# Patient Record
Sex: MALE | Race: WHITE | Employment: OTHER | URBAN - NONMETROPOLITAN AREA
[De-identification: names, ages, dates, MRNs, and addresses within clinical notes are randomized per-mention and may not be internally consistent; named-entity substitution may affect disease eponyms.]

---

## 2019-05-20 ENCOUNTER — HOSPITAL ENCOUNTER (OUTPATIENT)
Dept: CARDIAC REHAB | Age: 74
Setting detail: THERAPIES SERIES
Discharge: HOME | End: 2019-05-20
Payer: OTHER GOVERNMENT

## 2019-05-20 VITALS
SYSTOLIC BLOOD PRESSURE: 136 MMHG | RESPIRATION RATE: 24 BRPM | HEART RATE: 95 BPM | DIASTOLIC BLOOD PRESSURE: 74 MMHG | HEIGHT: 68 IN | OXYGEN SATURATION: 95 % | WEIGHT: 122 LBS | BODY MASS INDEX: 18.49 KG/M2

## 2019-05-20 PROCEDURE — G0424 PULMONARY REHAB W EXER: HCPCS

## 2019-05-20 RX ORDER — LATANOPROST 50 UG/ML
1 SOLUTION/ DROPS OPHTHALMIC NIGHTLY
COMMUNITY

## 2019-05-20 RX ORDER — TAMSULOSIN HYDROCHLORIDE 0.4 MG/1
0.4 CAPSULE ORAL DAILY
COMMUNITY

## 2019-05-20 RX ORDER — DORZOLAMIDE HCL 20 MG/ML
2 SOLUTION/ DROPS OPHTHALMIC 3 TIMES DAILY
COMMUNITY

## 2019-05-20 RX ORDER — ALBUTEROL SULFATE 0.63 MG/3ML
1 SOLUTION RESPIRATORY (INHALATION) EVERY 6 HOURS PRN
COMMUNITY

## 2019-05-20 RX ORDER — BUDESONIDE AND FORMOTEROL FUMARATE DIHYDRATE 160; 4.5 UG/1; UG/1
2 AEROSOL RESPIRATORY (INHALATION) 2 TIMES DAILY
COMMUNITY

## 2019-05-20 RX ORDER — ATENOLOL 25 MG/1
12.5 TABLET ORAL DAILY
COMMUNITY

## 2019-05-20 RX ORDER — FLUTICASONE PROPIONATE 50 MCG
1 SPRAY, SUSPENSION (ML) NASAL DAILY
COMMUNITY

## 2019-05-20 RX ORDER — BRIMONIDINE TARTRATE 2 MG/ML
1 SOLUTION/ DROPS OPHTHALMIC 3 TIMES DAILY
COMMUNITY

## 2019-05-20 RX ORDER — MENTHOL AND ZINC OXIDE .44; 20.625 G/100G; G/100G
OINTMENT TOPICAL DAILY
COMMUNITY

## 2019-05-20 NOTE — PROGRESS NOTES
coping skills.  -Positive support system. Fall Risk assess: [x] Yes  [] No  Assistive Device:   [] Cane  [] Walker [x] Wheel Chair  [] Gait belt Pt presents with a wheelchair, but does walk. Patient/Program goal:   · Increased stamina/strength to 30-50min total exercise by increasing 1-2 level/wk and/or 1-2min/wk to maintain SaO2 > 90%, achieve THR and RPE 11-13. · Introduce weights/ therabands 1-2# for 5-10 reps. · Less SOB on exertion by utilizing pursed-lip breathing. · Proper use of inhalers, O2 therapy and meds. · Increased knowledge of COPD and optimal management. · [place] with chronic health changes. · Manage stress by utilizing relaxation techniques. · Increased knowledge of health eating. · Improve/Maintain quality of life. · Manage/Control blood pressure.     Physician Changes/Comments:      Pulmonary Rehab Staff

## 2019-05-20 NOTE — PROGRESS NOTES
Pulmonary Rehab Initial History and Assessment    Nathalie Jovel [id number]  5/20/2019    Living Will:  [x] Yes [] No   On File:  [] Yes [x] No   Durable Power of :  [x] Yes [] No     Medical History  No past medical history on file. Symptoms:  [x] Cough (frequency):  frequent  [] Wheezing (Onset/Cause):  [] Fluid Retention (Where/When):  [x] Sleeping Problems (# Hours):6- frequent awakenings  [x] Sputum (Volume/Color/Viscosity/Frequency):frequent- clear  [x] Dyspnea (onset/cause):all the time  [x] Extra Pillows (Number): 2    Dyspnea Index (choose one):  [] Class 1 - No dyspnea except on strenuous exercise  [] Class 2 - SOB when walking up short hill  [] Class 3 - Dyspnea limits walking pace (slower than others) and stops to breathe.  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  [] Asbestos    Respiratory Infections/Hospitalizations:  # infections/year: 0 Antibiotic use:  # Hospitalizations/year 0 When/Problem:  Vaccines:   [x] Flu (yr):  [x] Pneumonia (yr):    Activities of Daily Living:  [x] Independent      Needs assist with:    [x] Hygiene [] Diet  [x] ADL  [] Other (explain):  [] Stairs    Fluid Intake (glasses/day):   Caffeine: 0/day   Water: 0/day  Juice- fruit   Beer- [facility]    Nutrition:  Appetite: [] good [] too good [x] poor  Eating out: 1/wk  Special Diet: no cabbage, no acidic foods,no raw veggies     Stress Management:  Stressors:  Relaxation Techniques: PLB reviewed,   Leisure activities/Hobbies: watching TV    Depression Screening:  [] Have you been feeling sad. ..down in the dumps? [] Have you lost interest in your job, sports, hobbies, friends? [x] Do you often feel tired? [] Do you have trouble sleeping or do you sleep too much? [] Have you been gaining or losing weight? [] Do you often feel down on yourself, that everything is your fault? [] Do you have troubled making decisions or concentrating on your work? [] Do you often feel agitated or like you can barely move? [] Do you ever feel that life isn't worth living?    *If greater than 5 symptoms listed,  notified. Pre- Program Assessment  1. The primary purpose of the lungs is to:   [x] A. Bring in fresh air   [] B. Breathe out stale air   [] C. Pump blood to our body   [] D. A and B  2. The muscle that does most of the work of breathing is the diaphragm? [x] True   [] False    [] Undecided  3. Smoking:   [] A. Damages the lining of the lungs.   [] B. Paralyzes cilia   [] C. May increase mucous production. [x] D. All of the above  4. My illness makes me feel helpless, confused, and out of control of my life at times. [] True   [x] False   [] Undecided  5. What is your current Diet? :regular  6. Parts of a treatment plan may include all of the following except:   [] A. Bronchodilator medication. [x] [place] at a fast pace.   [] C. Drinking lots of fluid.   [] D. Stop smoking. 7. Pursed lip breathing helps you to slow your breathing rate. [x] True   [] False   [] Undecided  8. Pursed lip breathing helps to prevent collapse of the airways. [x] True   [] False   [] Undecided  9. With diaphragmatic breathing your stomach moved out as you inhale. [x] True   [] False   [] Undecided  10. The environment around you does not affect your breathing. [] True   [x] False   [] Undecided  11. Signs of infection include:   [] Normal temperature   [] Increased fatigue and lack of energy   [] Change in amount of sputum   [] Increase appetite   [x] B and C  12.  Feeling full after a meal is normal and does not affect breathing. [] True   [x] False   [] Undecided  13. Smoking can cause lung disease. [x] True   [] False   [] Undecided  14. Work efficiency involves making tasks easier. [x] True   [] False   [] Undecided  15. If a little oxygen is good, then more is even better. [] True   [x] False   [] Undecided  16. I know the purpose for each of my medications. [x] True   [] False   [] Undecided  17. Relaxation makes you breath better. [x] True   [] False   [] Undecided  18. Drinking water has no effect on the mucous produced in your lungs. [] True   [x] False   [] Undecided  19. Exercise does not help people with lung disease. [] True   [x] False   [] Undecided  20. Cleaning my home equipment at least every 2 days with white vinegar and water (1:2) is vital to reduce the change of using contaminated equipment. [] True   [] False   [] Undecided  21. If I take more than 1 inhaler, I know in which order to take them. [x] True   [] False   [] Undecided  22. Family communication promotes closeness and decreases fear for everyone. [x] True   [] False   [] Undecided  23. Nutrition labels on food packages contain.   [] A. Calories   [] B. Fat   [] C. Protein   [] D. Carbohydrates   [x] E. All of the above  Comments:      Physical Findings   BP: 136/74   Pulse: 95   Resp: 24   SpO2: 95 %    Goals  · Increased stamina/strength to 30-50min total exercise by increasing 1-2 level/wk and/or 1-2min/wk to maintain SaO2 > 90%, achieve THR and RPE 11-13. · Introduce weights/ therabands 1-2# for 5-10 reps. · Less SOB on exertion by utilizing pursed-lip breathing. · Proper use of inhalers, O2 therapy and meds. · Increased knowledge of COPD and optimal management. · [place] with chronic health changes. · Manage stress by utilizing relaxation techniques. · Increased knowledge of health eating. · Improve/Maintain quality of life. · Manage/Control blood pressure.

## 2019-05-21 NOTE — PROGRESS NOTES
MEDICAL NUTRITION THERAPY - CARDIOPULMONARY FOOD DIARY EVALUATION  Client data    Ht: 68\" Wt: 122# BMI: 18.6  (normal/almost underweight)   Weight changes: unknown CBW: 55.5 kg   BMR: 1263 calories Est. total calorie needs: 1700 cals. Food diary:  Reveals only 2 meals were consumed with no snacks, skipping lunch is normal per diary. No whole fruits or vegetables, only Strawberry Banana V8 drink. No water recorded, only whole milk for cereal and 1 beer in the evening were recorded for beverages. Cheerios for breakfast and wheat toast for dinner, but unsure if it is 100% whole wheat. Snacks like Oreo cookies and Rice [employer] were eaten with breakfast. Protein choices were 2 sausage links and 1 egg. A few missing portion sizes and only breakfast time (8am) recorded. Recommendations include: Use whole grains (choose instead of refined wheat products)      Utilize whole fruit and vegetable use (at least 5 a day combined)      Utilize 6 smaller meals and snacks if it eases your breathing      Drink plenty of fluids to keep any phlegm thinner      Avoid sugar sweetened or carbonated beverages      Aim for daily activity (>30 minutes)      If you struggle with keeping weight, use more healthy fats such as canola,     olive or peanut oils. Adding heavy cream, cool whip, nuts, or peanut butter to     meals and snack are other options. Review highlighted food label handout for areas to focus on. Provided literature on Healthy Eating for Pulmonary Disease with commentary from food diary reviewed (including recommendations to improve nutrition intakes). (attached below).     Electronically signed by Clemente Davila Dietetic Intern 5/21/2019, 1:26 PM  Electronically signed by Barbara Mesa RD, LD on 5/21/2019 at 1:14 PM    Cardiopulmonary Rehab   Medical Nutrition Therapy Food Diary Evaluation    Patient Name: Maria Isabel Mingo   Dietetic Student: Clemente Davila   Registered Dietitian:  Barbara Mesa HERMINIO GOMEZ  Date:  5/21/2019    Dear Daily Bergeron,    Thank you for sharing your information about your eating patterns, it serves not only to help me see what you are eating, but you as well. Eating 3 meals a day is a great way to start, work on trying to eat lunch in order to be able to consume adequate nutrition. If you have shortness of breath, 6 small meals may be of benefit. Whole grains, fruits and vegetables, along with lean meats, low fat dairy and healthy fats are the cornerstones of your health. With that in mind, look below for some suggestions. · Overall, create well-balanced meals and utilize all of the food groups. Limit fat to 66 grams total fat, 13 saturated fat and zero (0) grams Trans fat (these numbers are based on the [place]olidated [place] Cholesterol Education Program and American Heart Association recommendations). Sodium should be kept under 2000 mg daily. · Aim for at least 2 servings of fresh or frozen veggies daily (not including peas, corn or potatoes). We call these real vegetables. Most people do not consume enough. They are good for vitamins, minerals and fiber. Use at lunch and dinner, or snacks. · Aim for at least 3 servings of fresh, frozen or canned fruit daily. Eat your fruits and avoid drinking them. Fresh fruit with the skin is the most nutritious way to go. Be consistent by having a fruit choice at each meal.  Keep in mind that every 4 ounces of juice is equivalent to a piece of fruit, so it is very easy to over-consume when we drink fruits. · When choosing meat, avoid high fat varieties, such as red meats. Three ounces, or about the size of a deck of cards is a good portion at lunch and dinner. Eggs are good for protein too, but limit yolks to 3 per week (per the American Heart Association). · When cooking, try to bake, broil or grill. If you have to shah something, use healthy oils or sprays that are canola, olive or peanut oil based.   · Be careful of hidden sodium in whole fruit and vegetable use (at least 5 a day combined)      Utilize 6 smaller meals and snacks if it eases your breathing      Drink plenty of fluids to keep any phlegm thinner      Avoid sugar sweetened or carbonated beverages      Aim for daily activity (>30 minutes)      If you struggle with keeping weight, use more healthy fats such as canola,     olive or peanut oils. Adding heavy cream, cool whip, nuts, peanut butter to     meals and snack are other options. Review highlighted food label handout for areas to focus on. Please contact a dietitian at [phone] or [phone] with any questions.

## 2019-05-22 ENCOUNTER — HOSPITAL ENCOUNTER (OUTPATIENT)
Dept: CARDIAC REHAB | Age: 74
Setting detail: THERAPIES SERIES
Discharge: HOME | End: 2019-05-22
Payer: OTHER GOVERNMENT

## 2019-05-22 PROCEDURE — 93798 PHYS/QHP OP CAR RHAB W/ECG: CPT

## 2019-05-22 RX ORDER — ATENOLOL 100 MG/1
100 TABLET ORAL DAILY
COMMUNITY

## 2019-05-23 ENCOUNTER — HOSPITAL ENCOUNTER (OUTPATIENT)
Dept: CARDIAC REHAB | Age: 74
Setting detail: THERAPIES SERIES
Discharge: HOME | End: 2019-05-23
Payer: OTHER GOVERNMENT

## 2019-05-23 PROCEDURE — G0424 PULMONARY REHAB W EXER: HCPCS

## 2019-05-23 PROCEDURE — 93798 PHYS/QHP OP CAR RHAB W/ECG: CPT

## 2019-05-29 ENCOUNTER — HOSPITAL ENCOUNTER (OUTPATIENT)
Dept: CARDIAC REHAB | Age: 74
Setting detail: THERAPIES SERIES
Discharge: HOME | End: 2019-05-29
Payer: OTHER GOVERNMENT

## 2019-05-29 PROCEDURE — G0424 PULMONARY REHAB W EXER: HCPCS

## 2019-05-29 PROCEDURE — 93798 PHYS/QHP OP CAR RHAB W/ECG: CPT

## 2019-06-03 ENCOUNTER — HOSPITAL ENCOUNTER (OUTPATIENT)
Dept: CARDIAC REHAB | Age: 74
Setting detail: THERAPIES SERIES
Discharge: HOME | End: 2019-06-03
Payer: OTHER GOVERNMENT

## 2019-06-03 PROCEDURE — G0424 PULMONARY REHAB W EXER: HCPCS

## 2019-06-05 ENCOUNTER — HOSPITAL ENCOUNTER (OUTPATIENT)
Dept: CARDIAC REHAB | Age: 74
Setting detail: THERAPIES SERIES
Discharge: HOME | End: 2019-06-05
Payer: OTHER GOVERNMENT

## 2019-06-05 PROCEDURE — G0424 PULMONARY REHAB W EXER: HCPCS

## 2019-06-06 ENCOUNTER — HOSPITAL ENCOUNTER (OUTPATIENT)
Dept: CARDIAC REHAB | Age: 74
Setting detail: THERAPIES SERIES
Discharge: HOME | End: 2019-06-06
Payer: OTHER GOVERNMENT

## 2019-06-06 PROCEDURE — G0424 PULMONARY REHAB W EXER: HCPCS

## 2019-06-10 ENCOUNTER — HOSPITAL ENCOUNTER (OUTPATIENT)
Dept: CARDIAC REHAB | Age: 74
Setting detail: THERAPIES SERIES
Discharge: HOME | End: 2019-06-10
Payer: OTHER GOVERNMENT

## 2019-06-10 PROCEDURE — G0424 PULMONARY REHAB W EXER: HCPCS

## 2019-06-12 ENCOUNTER — HOSPITAL ENCOUNTER (OUTPATIENT)
Dept: CARDIAC REHAB | Age: 74
Setting detail: THERAPIES SERIES
Discharge: HOME | End: 2019-06-12
Payer: OTHER GOVERNMENT

## 2019-06-12 PROCEDURE — G0424 PULMONARY REHAB W EXER: HCPCS

## 2019-06-13 ENCOUNTER — HOSPITAL ENCOUNTER (OUTPATIENT)
Dept: CARDIAC REHAB | Age: 74
Setting detail: THERAPIES SERIES
Discharge: HOME | End: 2019-06-13
Payer: OTHER GOVERNMENT

## 2019-06-13 PROCEDURE — G0424 PULMONARY REHAB W EXER: HCPCS

## 2019-06-17 ENCOUNTER — HOSPITAL ENCOUNTER (OUTPATIENT)
Dept: CARDIAC REHAB | Age: 74
Setting detail: THERAPIES SERIES
Discharge: HOME | End: 2019-06-17
Payer: OTHER GOVERNMENT

## 2019-06-17 PROCEDURE — G0424 PULMONARY REHAB W EXER: HCPCS

## 2019-06-19 ENCOUNTER — HOSPITAL ENCOUNTER (OUTPATIENT)
Dept: CARDIAC REHAB | Age: 74
Setting detail: THERAPIES SERIES
Discharge: HOME | End: 2019-06-19
Payer: OTHER GOVERNMENT

## 2019-06-19 PROCEDURE — 93798 PHYS/QHP OP CAR RHAB W/ECG: CPT

## 2019-06-19 NOTE — PROGRESS NOTES
Pulmonary Rehab Individualized Treatment Plan-30 day    Patient Name: Faustino Lester #: [de-identified]  Diagnosis: COPD   Date of last  Visit 6/18/19  Onset Date: 4/25/19  Referring Physician: Yoselin Vasquez)  Risk Stratification: high  Session Number: 11     EXERCISE    Stages of Change:   [] pre-contemplation   [x] Action   [] Contemplate   [] Maintainence   [] Prep   [] Relapse           Exercise Prescription:  Mode: [x] TM [x] B [x] STP [] EL [x] R  Frequency: 3 times weekly  Duration: 31-60 minutes  Intensity: 1.6-1.9 METS  Progression: 30-50min total exercise by increasing 1-2 level/wk and/or 1-2min/wk to maintain SaO2 > 90%, achieve THR and RPE 11-13. Pt has fluctuated speed from 0.7-1.1 as tolerated. SPO2: >90%  [x] O2 - Liters: 4-6 LPM  [] Resistance Training Weights (lbs):   Reps:      Hypertension:  [x] Yes  [] No  Resting BP: 122/64  Peak Exercise BP: 116/78  [] Med change? Intervention:  Home Exercise:  Type: walking  Duration: 30 minutes  Frequency: daily  O2 (L/min): 4-6 LPM   [] Resistance Training    Education:   [x] Equipment [place]  [x] PLB     [x] Ex Safety   [x] S/S to report    [x] Warm up/ Cool down  [x] O2 Therapy    [x] RPE Scale   [x] Energy conservation   [x] Dyspnea scale  [] Exercise specialist class-Home exercise         Target Goal:   - SPO2 > 90 during exercise  - Individual exercise Rx      Nutrition    Stages of Change:   [] pre-contemplation   [x] Action   [] Contemplate   [] Maintainence   [] Prep   [] Relapse     Diabetes:  [] Yes  [x] No  BS:   HbA1c:  Random BS:  BS in range: [] yes [] no  [] Med change? Weight Management:  Current Wt: 114  Wt Goal:     Dietary Goal:         Intervention:   [x] Dietitian Consult Scheduled for 6/20. Pt & wife reluctant to attend.       [x] Nurse/Patient Discussion     [] Diet Class          Education:  [] S&S hypo/hyperglycemia  [] Relate diabetes to pulmonary disease  [x] Healthy/balanced eating habits    Target Goal:  -HbA1c < 7%  -BMI < 25   Education    Stages of Change:    [] pre-contemplation   [x] Action   [] Contemplate   [] Maintainence   [] Prep   [] Relapse     Family support: [x] Yes  [] No    Tobacco use: [] Yes  [x] No  Date quit:2003  Quit date set:  # cigarettes smoked per day:   [] smokeless tobacco   Amount:    Breath sounds:    [] chest physio     [] Flutter   [] Acapella     Intervention:  [] Referred to smoking cessation counselor     [x] Individual education and counseling  [] Tobacco adjunct  [] Informed of education classes     Education:   [x] Dangers of smoking  [x] Dyspnea management  [x] Meds/MDI   [x] Pulmonary and secretion A&P; specific disease  [x] signs of infection  [x] Diaphragmatic Breathing    [x] Traveling   [x] Care of Home equipment    Target Goal:  -Complete cessation of tobacco use (if applicable)  -Self-man and prevention exacerbation  -Restore to highest level of independance      Psychosocial  Stages of Change:    [] pre-contemplation   [x] Action   [] Contemplate   [] Maintainence   [] Prep   [] Relapse    Intervention:   [] Psych Consult/   [x] Uses stress management    [] Physician Referral     [] Stress management class   [] Med change? Education:    [] Coping with Chronic illness   [x] Relaxation techniques   [x] S/S of Depression   [x] Support system    Target Goal:  -Assess presence or absence of depression using a valid screening tool. -Maximize coping skills.  -Positive support system. Patient/Program goal:   · Increased stamina/strength to 30-50min total exercise by increasing 1-2 level/wk and/or 1-2min/wk to maintain SaO2 > 90%, achieve THR and RPE 11-13. Pt has fluctuated speed from 0.7-1.1 as tolerated. Mets remain between 1.6-1.8. · Introduce weights/ therabands 1-2# for 5-10 reps. Will introduce at a later date  · Less SOB on exertion by utilizing pursed-lip breathing. Pt remains SOB during exercise.  Resolves with rest. Staff has continued to attempt to educate pt & wife re: PLB. Frequent reminders given. · Proper use of inhalers, O2 therapy and meds. Medications reviewed with Pt. All questions answered. · Increased knowledge of COPD and optimal management. Education continues  · [place] with chronic health changes. Education continues  · Manage stress by utilizing relaxation techniques. PLB & deep breathing exercises reviewed with pt. · Increased knowledge of health eating. Rate your plate given to [employer] for review. Results given to pt. Pt & wife are reluctant to meet with the [address]. · Improve/Maintain quality of life. Pt feels his stamina is increasing. · Manage/Control blood pressure.   WNL          Physician Changes/Comments:      Pulmonary Rehab Staff

## 2019-06-20 ENCOUNTER — HOSPITAL ENCOUNTER (OUTPATIENT)
Dept: CARDIAC REHAB | Age: 74
Setting detail: THERAPIES SERIES
Discharge: HOME | End: 2019-06-20
Payer: OTHER GOVERNMENT

## 2019-06-20 PROCEDURE — G0424 PULMONARY REHAB W EXER: HCPCS

## 2019-06-26 ENCOUNTER — HOSPITAL ENCOUNTER (OUTPATIENT)
Dept: CARDIAC REHAB | Age: 74
Setting detail: THERAPIES SERIES
Discharge: HOME | End: 2019-06-26
Payer: OTHER GOVERNMENT

## 2019-06-26 PROCEDURE — G0424 PULMONARY REHAB W EXER: HCPCS

## 2019-06-27 ENCOUNTER — HOSPITAL ENCOUNTER (OUTPATIENT)
Dept: CARDIAC REHAB | Age: 74
Setting detail: THERAPIES SERIES
Discharge: HOME | End: 2019-06-27
Payer: OTHER GOVERNMENT

## 2019-06-27 PROCEDURE — G0424 PULMONARY REHAB W EXER: HCPCS

## 2019-07-01 ENCOUNTER — HOSPITAL ENCOUNTER (OUTPATIENT)
Dept: CARDIAC REHAB | Age: 74
Setting detail: THERAPIES SERIES
Discharge: HOME | End: 2019-07-01
Payer: OTHER GOVERNMENT

## 2019-07-01 PROCEDURE — 93798 PHYS/QHP OP CAR RHAB W/ECG: CPT

## 2019-07-01 PROCEDURE — G0424 PULMONARY REHAB W EXER: HCPCS

## 2019-07-03 ENCOUNTER — HOSPITAL ENCOUNTER (OUTPATIENT)
Dept: CARDIAC REHAB | Age: 74
Setting detail: THERAPIES SERIES
Discharge: HOME | End: 2019-07-03
Payer: OTHER GOVERNMENT

## 2019-07-03 PROCEDURE — G0424 PULMONARY REHAB W EXER: HCPCS

## 2019-07-08 ENCOUNTER — HOSPITAL ENCOUNTER (OUTPATIENT)
Dept: CARDIAC REHAB | Age: 74
Setting detail: THERAPIES SERIES
Discharge: HOME | End: 2019-07-08
Payer: OTHER GOVERNMENT

## 2019-07-08 PROCEDURE — G0424 PULMONARY REHAB W EXER: HCPCS

## 2019-07-10 ENCOUNTER — HOSPITAL ENCOUNTER (OUTPATIENT)
Dept: CARDIAC REHAB | Age: 74
Setting detail: THERAPIES SERIES
Discharge: HOME | End: 2019-07-10
Payer: OTHER GOVERNMENT

## 2019-07-10 PROCEDURE — G0424 PULMONARY REHAB W EXER: HCPCS

## 2019-07-15 ENCOUNTER — HOSPITAL ENCOUNTER (OUTPATIENT)
Dept: CARDIAC REHAB | Age: 74
Setting detail: THERAPIES SERIES
Discharge: HOME | End: 2019-07-15
Payer: OTHER GOVERNMENT

## 2019-07-15 PROCEDURE — G0424 PULMONARY REHAB W EXER: HCPCS

## 2019-07-17 ENCOUNTER — HOSPITAL ENCOUNTER (OUTPATIENT)
Dept: CARDIAC REHAB | Age: 74
Setting detail: THERAPIES SERIES
Discharge: HOME | End: 2019-07-17
Payer: OTHER GOVERNMENT

## 2019-07-17 PROCEDURE — G0424 PULMONARY REHAB W EXER: HCPCS

## 2019-07-17 NOTE — PROGRESS NOTES
Pulmonary Rehab Individualized Treatment Plan-60 day    Patient Name: Alissa Drake  Date of Initial Assessment: 7/17/2019  ACCOUNT #: [de-identified]  Diagnosis: COPD  Date of last  Visit Unsure  Onset Date: 04/25/19  Referring Physician: [facility]   Risk Stratification: High  Session Number: 21     EXERCISE    Stages of Change:   [] pre-contemplation   [x] Action   [] Contemplate   [] Maintainence   [x] Prep   [] Relapse           Exercise Prescription:  Mode: [x] TM [x] B [x] STP [] EL [x] R  Frequency: 3 x week  Duration: 31-60 min   Intensity: METS 1.5  Progression: Has not increased METS but has increased time on both machines. Struggling today due high temp and high humidity. SPO2: 94  [x] O2 - Liters:  2  [] Resistance Training Weights (lbs):   Reps:      Hypertension:  [] Yes  [x] No  Resting BP: 124/80  Peak Exercise BP: 110/72  [] Med change? Intervention:  Home Exercise:  Type: walking   Duration: 30 min   Frequency: [place]-pt is really struggling with increasing activity due to exetreme dyspnea. O2 (L/min):    [] Resistance Training    Education:   [x] Equipment [place]  [x] PLB     [x] Ex Safety   [x] S/S to report    [x] Warm up/ Cool down  [x] O2 Therapy    [x] RPE Scale   [x] Energy conservation   [x] Dyspnea scale  [x] Exercise specialist class-Home exercise          Target Goal:   - SPO2 > 91 during exercise  - Individual exercise Rx      Nutrition    Stages of Change:   [] pre-contemplation   [x] Action   [] Contemplate   [] Maintainence   [x] Prep   [] Relapse     Diabetes:  [] Yes  [x] No  BS:   HbA1c:  Random BS:  BS in range: [] yes [] no  [] Med change? Weight Management:  Current Wt: 111  Wt Goal: To maintain weight or increase.      Dietary Goal:        Intervention:   [x] Dietitian Consult       [x] Nurse/Patient Discussion    [x] Diet Class            Education:  [x] S&S hypo/hyperglycemia  [] Relate diabetes to pulmonary disease  [] Healthy/balanced eating habits    Target

## 2019-07-22 ENCOUNTER — HOSPITAL ENCOUNTER (OUTPATIENT)
Dept: CARDIAC REHAB | Age: 74
Setting detail: THERAPIES SERIES
Discharge: HOME | End: 2019-07-22
Payer: OTHER GOVERNMENT

## 2019-07-22 PROCEDURE — G0424 PULMONARY REHAB W EXER: HCPCS

## 2019-07-24 ENCOUNTER — HOSPITAL ENCOUNTER (OUTPATIENT)
Dept: CARDIAC REHAB | Age: 74
Setting detail: THERAPIES SERIES
Discharge: HOME | End: 2019-07-24
Payer: OTHER GOVERNMENT

## 2019-07-24 PROCEDURE — G0424 PULMONARY REHAB W EXER: HCPCS

## 2019-07-25 ENCOUNTER — HOSPITAL ENCOUNTER (OUTPATIENT)
Dept: CARDIAC REHAB | Age: 74
Setting detail: THERAPIES SERIES
Discharge: HOME | End: 2019-07-25
Payer: OTHER GOVERNMENT

## 2019-07-25 PROCEDURE — G0424 PULMONARY REHAB W EXER: HCPCS

## 2019-07-29 ENCOUNTER — HOSPITAL ENCOUNTER (OUTPATIENT)
Dept: CARDIAC REHAB | Age: 74
Setting detail: THERAPIES SERIES
Discharge: HOME | End: 2019-07-29
Payer: OTHER GOVERNMENT

## 2019-07-29 PROCEDURE — G0424 PULMONARY REHAB W EXER: HCPCS

## 2019-07-29 PROCEDURE — 93798 PHYS/QHP OP CAR RHAB W/ECG: CPT

## 2019-07-31 ENCOUNTER — HOSPITAL ENCOUNTER (OUTPATIENT)
Dept: CARDIAC REHAB | Age: 74
Setting detail: THERAPIES SERIES
Discharge: HOME | End: 2019-07-31
Payer: OTHER GOVERNMENT

## 2019-07-31 PROCEDURE — G0424 PULMONARY REHAB W EXER: HCPCS

## 2019-08-01 ENCOUNTER — HOSPITAL ENCOUNTER (OUTPATIENT)
Dept: CARDIAC REHAB | Age: 74
Setting detail: THERAPIES SERIES
Discharge: HOME | End: 2019-08-01
Payer: OTHER GOVERNMENT

## 2019-08-01 PROCEDURE — G0424 PULMONARY REHAB W EXER: HCPCS

## 2019-08-05 ENCOUNTER — HOSPITAL ENCOUNTER (OUTPATIENT)
Dept: CARDIAC REHAB | Age: 74
Setting detail: THERAPIES SERIES
Discharge: HOME | End: 2019-08-05
Payer: OTHER GOVERNMENT

## 2019-08-05 PROCEDURE — G0424 PULMONARY REHAB W EXER: HCPCS

## 2019-08-07 ENCOUNTER — HOSPITAL ENCOUNTER (OUTPATIENT)
Dept: CARDIAC REHAB | Age: 74
Setting detail: THERAPIES SERIES
Discharge: HOME | End: 2019-08-07
Payer: OTHER GOVERNMENT

## 2019-08-07 PROCEDURE — G0424 PULMONARY REHAB W EXER: HCPCS

## 2019-08-14 ENCOUNTER — HOSPITAL ENCOUNTER (OUTPATIENT)
Dept: CARDIAC REHAB | Age: 74
Setting detail: THERAPIES SERIES
Discharge: HOME | End: 2019-08-14
Payer: OTHER GOVERNMENT

## 2019-08-14 PROCEDURE — G0424 PULMONARY REHAB W EXER: HCPCS

## 2019-08-15 NOTE — PROGRESS NOTES
Pulmonary Rehab Individualized Treatment Plan-90 day    Patient Name: Darline Arreaga  Date of Initial Assessment: 8/15/2019  Diagnosis: COPD   Date of last  Visit 8/12/19  Onset Date: 4/25/19  Referring Physician: Bernice kohler  Risk Stratification: high  Session Number: 29   EXERCISE    Stages of Change:   [] pre-contemplation   [x] Action   [] Contemplate   [] Maintainence   [] Prep   [] Relapse           Exercise Prescription:  Mode: [x] TM [x] B [x] STP [] EL [x] R  Frequency: 3 times wekly  Duration: 31-60 minutes  Intensity: 1.6-1.8 METS  Progression:  30-50min total exercise by increasing 1-2 level/wk and/or 1-2min/wk to maintain SaO2 > 90%, achieve THR and RPE 11-13. Pt has fluctuated speed from 0.7-1.1 as tolerated. SPO2: >90%  [x] O2 - Liters: 4 lpm  [] Resistance Training Weights (lbs):   Reps:      Hypertension:  [] Yes  [x] No  Resting BP: 122/70  Peak Exercise BP: 132/72  [] Med change? Intervention:  Home Exercise:  Type: walking  Duration: 30 minutes  Frequency: daily  O2 (L/min): 4 lpm   [] Resistance Training    Education:   [x] Equipment [place]  [x] PLB     [x] Ex Safety   [x] S/S to report    [x] Warm up/ Cool down  [x] O2 Therapy    [x] RPE Scale   [] Energy conservation   [x] Dyspnea scale  [] Exercise specialist class-Home exercise         Target Goal:   - SPO2 > 90 during exercise  - Individual exercise Rx      Nutrition    Stages of Change:   [] pre-contemplation   [x] Action   [] Contemplate   [] Maintainence   [] Prep   [] Relapse     Diabetes:  [] Yes  [x] No  BS:   HbA1c:  Random BS:  BS in range: [] yes [] no  [] Med change? Weight Management:  Current Wt: 113.3  Wt Goal:     Dietary Goal:      Intervention:   [x] Dietitian Consult  Rate your plate and food diary completed and sent to Dietician for review and recommendations. Recommendations given to pt. Pt did not show up for Dietician appt.        [x] Nurse/Patient Discussion    [] Diet Class    Education:  [] S&S

## 2019-08-19 ENCOUNTER — HOSPITAL ENCOUNTER (OUTPATIENT)
Dept: CARDIAC REHAB | Age: 74
Setting detail: THERAPIES SERIES
Discharge: HOME | End: 2019-08-19
Payer: OTHER GOVERNMENT

## 2019-08-19 PROCEDURE — G0424 PULMONARY REHAB W EXER: HCPCS

## 2019-08-21 ENCOUNTER — HOSPITAL ENCOUNTER (OUTPATIENT)
Dept: CARDIAC REHAB | Age: 74
Setting detail: THERAPIES SERIES
Discharge: HOME | End: 2019-08-21
Payer: OTHER GOVERNMENT

## 2019-08-26 ENCOUNTER — HOSPITAL ENCOUNTER (OUTPATIENT)
Dept: CARDIAC REHAB | Age: 74
Setting detail: THERAPIES SERIES
Discharge: HOME | End: 2019-08-26
Payer: OTHER GOVERNMENT

## 2019-08-26 PROCEDURE — G0424 PULMONARY REHAB W EXER: HCPCS

## 2019-08-28 ENCOUNTER — HOSPITAL ENCOUNTER (OUTPATIENT)
Dept: CARDIAC REHAB | Age: 74
Setting detail: THERAPIES SERIES
Discharge: HOME | End: 2019-08-28
Payer: OTHER GOVERNMENT

## 2019-08-28 PROCEDURE — G0424 PULMONARY REHAB W EXER: HCPCS

## 2019-09-04 ENCOUNTER — HOSPITAL ENCOUNTER (OUTPATIENT)
Dept: CARDIAC REHAB | Age: 74
Setting detail: THERAPIES SERIES
Discharge: HOME | End: 2019-09-04
Payer: OTHER GOVERNMENT

## 2019-09-04 PROCEDURE — G0424 PULMONARY REHAB W EXER: HCPCS

## 2019-09-09 ENCOUNTER — HOSPITAL ENCOUNTER (OUTPATIENT)
Dept: CARDIAC REHAB | Age: 74
Setting detail: THERAPIES SERIES
Discharge: HOME | End: 2019-09-09
Payer: OTHER GOVERNMENT

## 2019-09-09 PROCEDURE — G0424 PULMONARY REHAB W EXER: HCPCS

## 2019-09-11 ENCOUNTER — HOSPITAL ENCOUNTER (OUTPATIENT)
Dept: CARDIAC REHAB | Age: 74
Setting detail: THERAPIES SERIES
Discharge: HOME | End: 2019-09-11
Payer: OTHER GOVERNMENT

## 2019-09-11 PROCEDURE — G0424 PULMONARY REHAB W EXER: HCPCS

## 2019-09-11 NOTE — PROGRESS NOTES
Pulmonary Rehab Individualized Treatment Plan-Discharge    Patient Name: Jerson Estes  Date of Initial Assessment: 9/11/2019  ACCOUNT #: [de-identified]  Diagnosis: COPD   Onset Date: 4/25/19  Referring Physician: [place]  Risk Stratification: high  Session Number: 36   EXERCISE    Stages of Change:   [] pre-contemplation   [x] Action   [] Contemplate   [] Maintainence   [] Prep   [] Relapse      [x] 6-MWT   [] Stress test  [] Other:      Walked ft: 475.2  Max HR: 114  O2: 4-5 lpm  RPE: 12  SPO2: 88%-94%  MET Level: 2.0             Exercise Prescription:  Mode: [x] TM [x] B [x] STP [] EL [x] R  Frequency: 3 times weekly  Duration: 31-60 minutes  Intensity: 2.0 METS  Progression: 30-50min total exercise by increasing 1-2 level/wk and/or 1-2min/wk to maintain SaO2 > 90%, achieve THR and RPE 11-13.  Pt has fluctuated speed from 0.7-1.1 as tolerated. SPO2: >90%  [x] O2 - Liters: 4-5 lpm  [] Resistance Training Weight (lbs):   Reps:      Hypertension:  [x] Yes  [] No  Resting BP: 126/70  Peak Exercise BP: 110/60  [] med change? Intervention:  Home Exercise:  Type: walking  Duration: 30 minutes  Frequency: daily  O2 (L/min): 4-5 LPM   [] Resistance Training    Education:   [x] Education goals met        Target Goal:   - SPO2 > 90 during exercise  - Individual exercise Rx      Nutrition    Stages of Change:   [] pre-contemplation   [x] Action   [] Contemplate   [] Maintainence   [] Prep   [] Relapse     Diabetes:  [] Yes  [x] No  FBS:   HbA1c:  Random BS:  BS in Range: [] Yes  [] No  [] Med change? Weight Management:  Weight: 116.3  Height: 5'8\"  BMI: 18.6  Wt Goal: 1-2 lbs/wk    Intervention:   [x] Dietitian Consult -Rate your plate and food diary completed and sent to Dietician for review and recommendations. Recommendations given to pt.  Pt did not show up for Dietician appt      [x] Nurse/Patient Discussion   Dietary Goal:     [] Diet Class           [] Referred to Diabetes Education     Education:  [x] Exercise does not help people with lung disease. [] True   [x] False   [] Undecided  20. Cleaning my home equipment at least every 2 days with white vinegar and water (1:2) is vital to reduce the change of using contaminated equipment. [x] True   [] False   [] Undecided  21. If I take more than 1 inhaler, I know in which order to take them. [x] True   [] False   [] Undecided  22. Family communication promotes closeness and decreases fear for everyone. [x] True   [] False   [] Undecided  23. Nutrition labels on food packages contain.   [] A. Calories   [] B. Fat   [] C. Protein   [] D. Carbohydrates   [x] E. All of the above  Comments:      Education:    [x] Education goals met    Target Goal:  -Assess presence or absence of depression using a valid screening tool. -Maximize coping skills.  -Positive support system. Patient/Program goal:   · Increased stamina/strength to 30-50min total exercise by increasing 1-2 level/wk and/or 1-2min/wk to maintain SaO2 > 90%, achieve THR and RPE 11-13.  Pt has fluctuated speed from 0.7-1.1 as tolerated. Mets remain between 1.6-2.0 Pt has only been [facility] twice weekly. · Introduce weights/ therabands 1-2# for 5-10 reps.  Staff has been unable to introduce weights and therabands d/t pts continued weakness and SOB. · Less SOB on exertion by utilizing pursed-lip breathing. Pt remains SOB during exercise. Resolves with rest. Staff continued to attempt to educate pt & wife re: PLB. Frequent reminders given. · Proper use of inhalers, O2 therapy and meds. Medications reviewed with Pt. All questions answered.   · Increased knowledge of COPD and optimal management.  Education complete  · [place] with chronic health changes. Education complete  · Manage stress by utilizing relaxation techniques. PLB & deep breathing exercises reviewed with pt. · Increased knowledge of health eating.  Rate your plate given to [place] for review. Results given to pt.  Pt & wife were